# Patient Record
Sex: FEMALE | Race: WHITE | NOT HISPANIC OR LATINO
[De-identification: names, ages, dates, MRNs, and addresses within clinical notes are randomized per-mention and may not be internally consistent; named-entity substitution may affect disease eponyms.]

---
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
## 2020-06-05 ENCOUNTER — APPOINTMENT (OUTPATIENT)
Dept: PEDIATRICS | Facility: CLINIC | Age: 20
End: 2020-06-05
Payer: COMMERCIAL

## 2020-06-05 DIAGNOSIS — Z23 ENCOUNTER FOR IMMUNIZATION: ICD-10-CM

## 2020-06-05 PROCEDURE — 86580 TB INTRADERMAL TEST: CPT

## 2020-06-26 ENCOUNTER — APPOINTMENT (OUTPATIENT)
Dept: PEDIATRICS | Facility: CLINIC | Age: 20
End: 2020-06-26
Payer: COMMERCIAL

## 2020-06-26 PROCEDURE — 86580 TB INTRADERMAL TEST: CPT

## 2020-08-28 ENCOUNTER — APPOINTMENT (OUTPATIENT)
Dept: PEDIATRICS | Facility: CLINIC | Age: 20
End: 2020-08-28

## 2021-01-08 ENCOUNTER — APPOINTMENT (OUTPATIENT)
Dept: PEDIATRICS | Facility: CLINIC | Age: 21
End: 2021-01-08
Payer: COMMERCIAL

## 2021-01-08 VITALS — TEMPERATURE: 100.1 F

## 2021-01-08 DIAGNOSIS — U07.1 COVID-19: ICD-10-CM

## 2021-01-08 PROCEDURE — 99072 ADDL SUPL MATRL&STAF TM PHE: CPT

## 2021-01-08 PROCEDURE — 99212 OFFICE O/P EST SF 10 MIN: CPT

## 2021-01-08 NOTE — HISTORY OF PRESENT ILLNESS
[de-identified] : COVID 19 POSITIVE 10 DAYS AGO [FreeTextEntry6] : + covid on christmas day- asymptomatic x 10 days\par afebrile

## 2021-05-27 ENCOUNTER — APPOINTMENT (OUTPATIENT)
Dept: PEDIATRICS | Facility: CLINIC | Age: 21
End: 2021-05-27
Payer: COMMERCIAL

## 2021-05-27 VITALS
HEIGHT: 63.25 IN | TEMPERATURE: 98.1 F | BODY MASS INDEX: 20.39 KG/M2 | WEIGHT: 116.5 LBS | DIASTOLIC BLOOD PRESSURE: 72 MMHG | SYSTOLIC BLOOD PRESSURE: 123 MMHG | HEART RATE: 92 BPM

## 2021-05-27 DIAGNOSIS — Z00.00 ENCOUNTER FOR GENERAL ADULT MEDICAL EXAMINATION W/OUT ABNORMAL FINDINGS: ICD-10-CM

## 2021-05-27 DIAGNOSIS — Z00.129 ENCOUNTER FOR ROUTINE CHILD HEALTH EXAMINATION W/OUT ABNORMAL FINDINGS: ICD-10-CM

## 2021-05-27 LAB
BILIRUB UR QL STRIP: NEGATIVE
CLARITY UR: CLEAR
COLLECTION METHOD: NORMAL
GLUCOSE UR-MCNC: NEGATIVE
HCG UR QL: 0.2 EU/DL
HGB UR QL STRIP.AUTO: NEGATIVE
KETONES UR-MCNC: NEGATIVE
LEUKOCYTE ESTERASE UR QL STRIP: NEGATIVE
NITRITE UR QL STRIP: NEGATIVE
PH UR STRIP: 6
PROT UR STRIP-MCNC: NEGATIVE
SP GR UR STRIP: 1.02

## 2021-05-27 PROCEDURE — 99395 PREV VISIT EST AGE 18-39: CPT | Mod: 25

## 2021-05-27 PROCEDURE — 81003 URINALYSIS AUTO W/O SCOPE: CPT | Mod: QW

## 2021-05-27 PROCEDURE — 96127 BRIEF EMOTIONAL/BEHAV ASSMT: CPT

## 2021-05-27 PROCEDURE — 90471 IMMUNIZATION ADMIN: CPT

## 2021-05-27 PROCEDURE — 96160 PT-FOCUSED HLTH RISK ASSMT: CPT | Mod: 59

## 2021-05-27 PROCEDURE — 90621 MENB-FHBP VACC 2/3 DOSE IM: CPT

## 2021-05-27 NOTE — DISCUSSION/SUMMARY
[] : The components of the vaccine(s) to be administered today are listed in the plan of care. The disease(s) for which the vaccine(s) are intended to prevent and the risks have been discussed with the caretaker.  The risks are also included in the appropriate vaccination information statements which have been provided to the patient's caregiver.  The caregiver has given consent to vaccinate. [Met privately with the adolescent for part of the office visit?] : Met privately with the adolescent for part of the office visit? Yes [Adolescent demonstrates understanding of his/her conditions and how to take prescribed medications?] : Adolescent demonstrates understanding of his/her conditions and how to take prescribed medications? Yes [Adolescent asks questions during each office  visit and participates in the care plan?] : Adolescent asks questions during each office visit and participates in the care plan? Yes [Adolescent is competent in independently making appointments, filling prescriptions, following up on referrals, and seeking emergency services, as needed?] : Adolescent is competent in independently making appointments, filling prescriptions, following up on referrals, and seeking emergency services, as needed? Yes [Adolescent's caregivers were provided with the opportunity to discuss their concerns about transferring decision making responsibility to the adolescent?] : Adolescent's caregivers were provided with the opportunity to discuss their concerns about transferring decision making responsibility to the adolescent? Yes [Discussed using [facility] to access health records and communicate with the adolescent's care team?] : Discussed using [facility] to access health records and communicate with the adolescent's care team? Yes [Initiated discussion about transfer to an adult healthcare provider.  Provided copy of transition letter?] : Initiated discussion about transfer to an adult healthcare provider.  Provided copy of transition letter? Yes [Discussed choices for adult care and assist in identifying possible care providers?] : Discussed choices for adult care and assist in identifying possible care providers? Yes [Initiated communication with the adult provider that the family and adolescent has selected?] : Initiated communication with the adult provider that the family and adolescent has selected? Yes [Transferred health records?] : Transferred health records? No [Discussed nuances of care with the adult provider?] : Discussed nuances of care with the adult provider? No [Followed up after the transfer?] : Followed up after the transfer? No [FreeTextEntry1] : well  child   normal exam

## 2021-06-01 LAB
ALBUMIN SERPL ELPH-MCNC: 4.8 G/DL
ALP BLD-CCNC: 42 U/L
ALT SERPL-CCNC: 23 U/L
ANION GAP SERPL CALC-SCNC: 14 MMOL/L
AST SERPL-CCNC: 22 U/L
BASOPHILS # BLD AUTO: 0.04 K/UL
BASOPHILS NFR BLD AUTO: 0.9 %
BILIRUB SERPL-MCNC: 0.5 MG/DL
BUN SERPL-MCNC: 14 MG/DL
CALCIUM SERPL-MCNC: 9.6 MG/DL
CHLORIDE SERPL-SCNC: 101 MMOL/L
CHOLEST SERPL-MCNC: 208 MG/DL
CO2 SERPL-SCNC: 24 MMOL/L
CREAT SERPL-MCNC: 0.73 MG/DL
EOSINOPHIL # BLD AUTO: 0.06 K/UL
EOSINOPHIL NFR BLD AUTO: 1.3 %
GLUCOSE SERPL-MCNC: 87 MG/DL
HCT VFR BLD CALC: 41.8 %
HDLC SERPL-MCNC: 70 MG/DL
HGB BLD-MCNC: 13.5 G/DL
IMM GRANULOCYTES NFR BLD AUTO: 0.2 %
LDLC SERPL CALC-MCNC: 122 MG/DL
LYMPHOCYTES # BLD AUTO: 1.59 K/UL
LYMPHOCYTES NFR BLD AUTO: 33.8 %
M TB IFN-G BLD-IMP: NEGATIVE
MAN DIFF?: NORMAL
MCHC RBC-ENTMCNC: 29.9 PG
MCHC RBC-ENTMCNC: 32.3 GM/DL
MCV RBC AUTO: 92.5 FL
MEV IGG FLD QL IA: 130 AU/ML
MEV IGG+IGM SER-IMP: POSITIVE
MONOCYTES # BLD AUTO: 0.32 K/UL
MONOCYTES NFR BLD AUTO: 6.8 %
MUV AB SER-ACNC: POSITIVE
MUV IGG SER QL IA: 24.7 AU/ML
NEUTROPHILS # BLD AUTO: 2.68 K/UL
NEUTROPHILS NFR BLD AUTO: 57 %
NONHDLC SERPL-MCNC: 138 MG/DL
PLATELET # BLD AUTO: 246 K/UL
POTASSIUM SERPL-SCNC: 3.9 MMOL/L
PROT SERPL-MCNC: 7.5 G/DL
QUANTIFERON TB PLUS MITOGEN MINUS NIL: 8.83 IU/ML
QUANTIFERON TB PLUS NIL: 0.01 IU/ML
QUANTIFERON TB PLUS TB1 MINUS NIL: 0 IU/ML
QUANTIFERON TB PLUS TB2 MINUS NIL: 0 IU/ML
RBC # BLD: 4.52 M/UL
RBC # FLD: 12.5 %
RUBV IGG FLD-ACNC: 1.8 INDEX
RUBV IGG SER-IMP: POSITIVE
SODIUM SERPL-SCNC: 139 MMOL/L
T3 SERPL-MCNC: 126 NG/DL
T4 SERPL-MCNC: 6.9 UG/DL
TRIGL SERPL-MCNC: 83 MG/DL
WBC # FLD AUTO: 4.7 K/UL

## 2022-06-29 ENCOUNTER — RESULT REVIEW (OUTPATIENT)
Age: 22
End: 2022-06-29